# Patient Record
Sex: FEMALE | Race: WHITE
[De-identification: names, ages, dates, MRNs, and addresses within clinical notes are randomized per-mention and may not be internally consistent; named-entity substitution may affect disease eponyms.]

---

## 2020-03-10 ENCOUNTER — HOSPITAL ENCOUNTER (EMERGENCY)
Dept: HOSPITAL 74 - JER | Age: 45
LOS: 1 days | Discharge: HOME | End: 2020-03-11
Payer: COMMERCIAL

## 2020-03-10 VITALS — HEART RATE: 87 BPM | TEMPERATURE: 98.7 F | SYSTOLIC BLOOD PRESSURE: 94 MMHG | DIASTOLIC BLOOD PRESSURE: 77 MMHG

## 2020-03-10 VITALS — BODY MASS INDEX: 38 KG/M2

## 2020-03-10 DIAGNOSIS — N39.0: Primary | ICD-10-CM

## 2020-03-10 DIAGNOSIS — R05: ICD-10-CM

## 2020-03-10 NOTE — PDOC
Attending Attestation





- Resident


Resident Name: Ronal Cortés





- ED Attending Attestation


I have performed the following: I have examined & evaluated the patient, The 

case was reviewed & discussed with the resident, I agree w/resident's findings &

plan





- HPI


HPI: 





03/11/20 02:36


see resident hpi





- Physicial Exam


PE: 





03/11/20 02:36


see resident exam





- Medical Decision Making





03/11/20 02:36


44-year-old female with sore throat body aches and left lower quadrant pain


Plan for labs, UA as well as CT scan abdomen and pelvis


IV fluid normal saline 1 L bolus as well as IV Tylenol given


Patient is well-appearing


Pending results plan for DC home

## 2020-03-10 NOTE — PDOC
Rapid Medical Evaluation


Chief Complaint: Cold Symptoms


Time Seen by Provider: 03/10/20 21:36


Medical Evaluation: 





03/10/20 21:39


I have performed a brief in-person evaluation of this patient.





The patient presents with a chief complaint of: Pt is a 43 y/o female with 

cough, subjective fevers, constipation for the last 8 days.  She has been taking

Advil and Tylenol intermittently.  She denies any current pain.  





Pertinent physical exam findings:stable, No respiratory distress, afebrile in t

riage





I have ordered the following: chest xray





The patient will proceed to the ED for further evaluation





**Discharge Disposition





- Diagnosis


 Cough








- Referrals





- Patient Instructions





- Post Discharge Activity

## 2020-03-11 LAB
ALBUMIN SERPL-MCNC: 3.5 G/DL (ref 3.4–5)
ALP SERPL-CCNC: 60 U/L (ref 45–117)
ALT SERPL-CCNC: 30 U/L (ref 13–61)
ANION GAP SERPL CALC-SCNC: 7 MMOL/L (ref 8–16)
APPEARANCE UR: (no result)
AST SERPL-CCNC: 18 U/L (ref 15–37)
BACTERIA # UR AUTO: 499.9 /HPF
BASOPHILS # BLD: 0.8 % (ref 0–2)
BILIRUB SERPL-MCNC: 0.1 MG/DL (ref 0.2–1)
BILIRUB UR STRIP.AUTO-MCNC: NEGATIVE MG/DL
BUN SERPL-MCNC: 13.6 MG/DL (ref 7–18)
CALCIUM SERPL-MCNC: 8.6 MG/DL (ref 8.5–10.1)
CASTS URNS QL MICRO: 10 /LPF (ref 0–8)
CHLORIDE SERPL-SCNC: 108 MMOL/L (ref 98–107)
CO2 SERPL-SCNC: 25 MMOL/L (ref 21–32)
COLOR UR: YELLOW
CREAT SERPL-MCNC: 0.7 MG/DL (ref 0.55–1.3)
DEPRECATED RDW RBC AUTO: 14.5 % (ref 11.6–15.6)
EOSINOPHIL # BLD: 1.9 % (ref 0–4.5)
EPITH CASTS URNS QL MICRO: 13.3 /HPF
GLUCOSE SERPL-MCNC: 86 MG/DL (ref 74–106)
HCT VFR BLD CALC: 36.7 % (ref 32.4–45.2)
HGB BLD-MCNC: 12.1 GM/DL (ref 10.7–15.3)
KETONES UR QL STRIP: NEGATIVE
LEUKOCYTE ESTERASE UR QL STRIP.AUTO: (no result)
LIPASE SERPL-CCNC: 180 U/L (ref 73–393)
LYMPHOCYTES # BLD: 35 % (ref 8–40)
MCH RBC QN AUTO: 27.5 PG (ref 25.7–33.7)
MCHC RBC AUTO-ENTMCNC: 32.9 G/DL (ref 32–36)
MCV RBC: 83.7 FL (ref 80–96)
MONOCYTES # BLD AUTO: 6.1 % (ref 3.8–10.2)
NEUTROPHILS # BLD: 56.2 % (ref 42.8–82.8)
NITRITE UR QL STRIP: NEGATIVE
PH UR: 6 [PH] (ref 5–8)
PLATELET # BLD AUTO: 370 K/MM3 (ref 134–434)
PMV BLD: 7.9 FL (ref 7.5–11.1)
POTASSIUM SERPLBLD-SCNC: 4 MMOL/L (ref 3.5–5.1)
PROT SERPL-MCNC: 7.5 G/DL (ref 6.4–8.2)
PROT UR QL STRIP: (no result)
PROT UR QL STRIP: NEGATIVE
RBC # BLD AUTO: 2 /HPF (ref 0–4)
RBC # BLD AUTO: 4.38 M/MM3 (ref 3.6–5.2)
SODIUM SERPL-SCNC: 140 MMOL/L (ref 136–145)
SP GR UR: 1.02 (ref 1.01–1.03)
UROBILINOGEN UR STRIP-MCNC: 0.2 MG/DL (ref 0.2–1)
WBC # BLD AUTO: 10.8 K/MM3 (ref 4–10)
WBC # UR AUTO: 26 /HPF (ref 0–5)

## 2020-03-11 NOTE — PDOC
History of Present Illness





- General


Chief Complaint: Cold Symptoms


Stated Complaint: FEVER


Time Seen by Provider: 03/10/20 21:36


History Source: Patient


Exam Limitations: No Limitations





- History of Present Illness


Initial Comments: 





03/11/20 00:04


44-year-old female with a past medical history of asthma who presents with eight

days of cold like symptoms. The patient states that for a day shes had 

intermittent fevers, sore throat, try throat, nausea but no vomiting, slight c

ough, and intermittent headache. The patient also endorses two days of left 

upper quadrant of [place] pain. The patient describes the pain as doll non-

radiating, without exacerbating or alleviating factors. She denies chest pain, 

shortness of breath, numbness, tingling, weakness. She has been taking Tylenol 

for the fever.





Past History





- Past Medical History


Allergies/Adverse Reactions: 


                                    Allergies











Allergy/AdvReac Type Severity Reaction Status Date / Time


 


No Known Allergies Allergy   Verified 03/11/20 00:15











Home Medications: 


Ambulatory Orders





Cephalexin Monohydrate [Keflex -] 500 mg PO BID #10 capsule 03/11/20 








COPD: No





- Psycho Social/Smoking Cessation Hx


Smoking History: Never smoked


Have you smoked in the past 12 months: No


Information on smoking cessation initiated: No


Hx Alcohol Use: No


Drug/Substance Use Hx: No





**Review of Systems





- Review of Systems


Able to Perform ROS?: Yes


Comments:: 





03/11/20 00:05


GENERAL/CONSTITUTIONAL: + for fever and weakness. No chills. 


HEAD, EYES, EARS, NOSE AND THROAT: No change in vision. No ear pain or 

discharge. No sore throat.


CARDIOVASCULAR: No chest pain, palpitations, or lightheadedness.


RESPIRATORY: + for cough. No wheezing, shortness of breath, or hemoptysis.


GASTROINTESTINAL: + for abdominal pain, constipation, and nausea. No vomiting or

diarrhea 


GENITOURINARY: No dysuria, frequency, hematuria, or change in urination.


MUSCULOSKELETAL: No joint or muscle swelling or pain. No neck or back pain.


SKIN: No rash or lesions. 


NEUROLOGIC: No headache, numbness, tingling, focal weakness, loss of 

consciousness, or change in strength/sensation.





Is the patient limited English proficient: No





*Physical Exam





- Vital Signs


                                Last Vital Signs











Temp Pulse Resp BP Pulse Ox


 


 98.7 F   87   18   94/77   100 


 


 03/10/20 21:39  03/10/20 21:39  03/10/20 21:39  03/10/20 21:39  03/10/20 21:39














- Physical Exam





03/11/20 00:06


GENERAL: Well developed, well nourished. Awake and alert. No acute distress. 

Obese.


HEENT: Normocephalic, atraumatic. Hearing grossly normal. Moist mucous 

membranes. PERRLA, EOMI. No conjunctival pallor. Sclera are non-icteric. 


NECK: Supple. Full ROM. No JVD. 


CARDIOVASCULAR: Regular rate and rhythm. No murmurs, rubs, or gallops.


PULMONARY: No evidence of respiratory distress. Lungs clear to auscultation 

bilaterally. No wheezing, rales, or rhonchi.


ABDOMINAL: Soft. TTP in LUQ. Non-distended. No rebound or guarding. 


GENITOURINARY: No CVA tenderness bilaterally.


MUSCULOSKELETAL: Normal range of motion at all joints. No bony deformities or 

tenderness. 


EXTREMITIES: No cyanosis. No clubbing. No edema. No calf tenderness or swelling.


SKIN: Warm and dry. Normal capillary refill. No rashes. No jaundice. 


NEUROLOGICAL: Alert, awake, appropriate. Cranial nerves 2-12 grossly intact. 

Normal speech. Gait is normal without ataxia.


PSYCHIATRIC: Cooperative. Good eye contact. Appropriate mood and affect.








ED Treatment Course





- LABORATORY


CBC & Chemistry Diagram: 


                                 03/11/20 00:15





                                 03/11/20 00:15





Medical Decision Making





- Medical Decision Making





03/11/20 00:05


44-year-old female with past medical history of asthma presents with eight days 

of cold like symptoms. Chest x-ray is negative on my preliminary read. Patient 

had mild tenderness in her left upper quadrant, will take CAT scan and abdomen 

pelvis. Will obtain labs, if I do fluids, and give IV Tylenol. Will reassess 

after labs and imaging.





03/11/20 03:51


UA shows UTI. Pending CTAP.





03/11/20 04:38


CTAP negative. Will d/c with abx and PCP f/u.








Discharge





- Discharge Information


Problems reviewed: Yes


Clinical Impression/Diagnosis: 


 Cough





UTI (urinary tract infection)


Qualifiers:


 Urinary tract infection type: site unspecified Hematuria presence: without 

hematuria Qualified Code(s): N39.0 - Urinary tract infection, site not specified





Condition: Good


Disposition: HOME





- Admission


No





- Additional Discharge Information


Prescriptions: 


Cephalexin Monohydrate [Keflex -] 500 mg PO BID #10 capsule





- Follow up/Referral


Referrals: 


Laquita De Leon MD [Primary Care Provider] - 





- Patient Discharge Instructions


Patient Printed Discharge Instructions:  DI for Viral Upper Respiratory 

Infection -- Adult


Additional Instructions: 


Your ER visit is not complete until your follow up with your primary care 

physician. Please follow up with your primary care physician in 1-2 days. 





Please return to the ER if you have any signs or symptoms of chest pain, 

shortness of breath, uncontrollable fever, chills, nausea, vomiting, numbness, 

tingling, or weakness in any part of your body, changes in vision, or slurred 

speech.





Please take your medications as prescribed.





Please return to the ER if symptoms persist, worsen, or new symptoms arise.





Lane visita a la sandeep de emergencias no est completa hasta lane seguimiento con lane 

mdico de atencin primaria. Surya un seguimiento con lane mdico de atencin 

primaria en 1-2 anthony.





Regrese a la sandeep de emergencias si tiene signos o sntomas de dolor en el 

pecho, falta de aliento, fiebre incontrolable, escalofros, nuseas, vmitos, 

entumecimiento, hormigueo o debilidad en alguna parte de lane cuerpo, cambios en 

la visin o dificultad para hablar.





Por favor tome rad medicamentos argelia se los recetaron.





Regrese a la sandeep de emergencias si los sntomas persisten, empeoran o surgen 

nuevos sntomas.


Print Language: Wolof





- Post Discharge Activity

## 2020-07-29 ENCOUNTER — HOSPITAL ENCOUNTER (EMERGENCY)
Dept: HOSPITAL 74 - JERFT | Age: 45
Discharge: HOME | End: 2020-07-29
Payer: COMMERCIAL

## 2020-07-29 VITALS — HEART RATE: 62 BPM | TEMPERATURE: 97.8 F | DIASTOLIC BLOOD PRESSURE: 67 MMHG | SYSTOLIC BLOOD PRESSURE: 126 MMHG

## 2020-07-29 VITALS — BODY MASS INDEX: 43.4 KG/M2

## 2020-07-29 DIAGNOSIS — M54.5: ICD-10-CM

## 2020-07-29 DIAGNOSIS — M79.641: Primary | ICD-10-CM

## 2020-07-29 PROCEDURE — 3E0333Z INTRODUCTION OF ANTI-INFLAMMATORY INTO PERIPHERAL VEIN, PERCUTANEOUS APPROACH: ICD-10-PCS
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
